# Patient Record
Sex: FEMALE | Race: WHITE | URBAN - NONMETROPOLITAN AREA
[De-identification: names, ages, dates, MRNs, and addresses within clinical notes are randomized per-mention and may not be internally consistent; named-entity substitution may affect disease eponyms.]

---

## 2022-09-22 ENCOUNTER — WEB ENCOUNTER (OUTPATIENT)
Dept: URBAN - NONMETROPOLITAN AREA CLINIC 4 | Facility: CLINIC | Age: 27
End: 2022-09-22

## 2022-09-27 ENCOUNTER — LAB OUTSIDE AN ENCOUNTER (OUTPATIENT)
Dept: URBAN - NONMETROPOLITAN AREA CLINIC 4 | Facility: CLINIC | Age: 27
End: 2022-09-27

## 2022-09-27 ENCOUNTER — OFFICE VISIT (OUTPATIENT)
Dept: URBAN - NONMETROPOLITAN AREA CLINIC 4 | Facility: CLINIC | Age: 27
End: 2022-09-27
Payer: COMMERCIAL

## 2022-09-27 ENCOUNTER — WEB ENCOUNTER (OUTPATIENT)
Dept: URBAN - NONMETROPOLITAN AREA CLINIC 4 | Facility: CLINIC | Age: 27
End: 2022-09-27

## 2022-09-27 VITALS
BODY MASS INDEX: 50.29 KG/M2 | HEART RATE: 78 BPM | SYSTOLIC BLOOD PRESSURE: 115 MMHG | DIASTOLIC BLOOD PRESSURE: 70 MMHG | TEMPERATURE: 97.5 F | HEIGHT: 63 IN | WEIGHT: 283.8 LBS

## 2022-09-27 DIAGNOSIS — E66.01 MORBID OBESITY: ICD-10-CM

## 2022-09-27 DIAGNOSIS — R13.10 DYSPHAGIA, UNSPECIFIED TYPE: ICD-10-CM

## 2022-09-27 DIAGNOSIS — K21.9 GASTROESOPHAGEAL REFLUX DISEASE, UNSPECIFIED WHETHER ESOPHAGITIS PRESENT: ICD-10-CM

## 2022-09-27 DIAGNOSIS — D50.9 IRON DEFICIENCY ANEMIA, UNSPECIFIED IRON DEFICIENCY ANEMIA TYPE: ICD-10-CM

## 2022-09-27 PROCEDURE — 99204 OFFICE O/P NEW MOD 45 MIN: CPT | Performed by: REGISTERED NURSE

## 2022-09-27 RX ORDER — POLYETHYLENE GLYCOL-3350 AND ELECTROLYTES 236; 6.74; 5.86; 2.97; 22.74 G/274.31G; G/274.31G; G/274.31G; G/274.31G; G/274.31G
AS DIRECTED POWDER, FOR SOLUTION ORAL
Qty: 1 KIT | Refills: 0 | OUTPATIENT

## 2022-09-27 RX ORDER — FLUOXETINE 40 MG/1
1 CAPSULE CAPSULE ORAL ONCE A DAY
Status: ACTIVE | COMMUNITY

## 2022-09-27 RX ORDER — FERROUS SULFATE 325(65) MG
1 TABLET TABLET ORAL ONCE A DAY
Status: ACTIVE | COMMUNITY

## 2022-09-27 RX ORDER — BUTALBITAL, ASPIRIN, AND CAFFEINE 50; 325; 40 MG/1; MG/1; MG/1
1 CAPSULE AS NEEDED CAPSULE ORAL
Status: ACTIVE | COMMUNITY

## 2022-09-27 RX ORDER — TIZANIDINE 4 MG/1
1 TABLET AS NEEDED TABLET ORAL THREE TIMES A DAY
Status: ACTIVE | COMMUNITY

## 2022-09-27 RX ORDER — CALCIUM CARBONATE/VITAMIN D3 600 MG-10
1 CAPSULE TABLET ORAL ONCE A DAY
Status: ACTIVE | COMMUNITY

## 2022-09-27 RX ORDER — LANSOPRAZOLE 30 MG/1
1 CAPSULE BEFORE A MEAL CAPSULE, DELAYED RELEASE ORAL ONCE A DAY
Status: ACTIVE | COMMUNITY

## 2022-09-27 RX ORDER — NAPROXEN 500 MG/1
1 TABLET WITH FOOD OR MILK AS NEEDED TABLET ORAL
Status: ACTIVE | COMMUNITY

## 2022-09-27 NOTE — HPI-TODAY'S VISIT:
9/27/22: Pt is a 26 yo female with PMH of morbid obesity, GERD, ANN who was referred by Dr. Wood for evaluation of GERD and ANN.  A copy of this document will be sent to the referring physician.  Pt reports Hx of GERD for several years. Seen by ENT in past. She has taken omeprazole and pantorazole in past. She currently takes lansoprazole which helps. She also reports dysphagia to solids and nausea. Denies odynophagia, vomiting, melena or hematochezia. Has had GB removed in 2013. She has occasional postprandial diarrhea. She also has oocasional constipation due to iron supplement. Has Hx of ANN for at least 10 years. Per labs 7/25, Hgb 13.2. Has never had GI wrokup in past. Has appt with bariatric surgeon, Dr. Michael Hagan and nutritionist on 10/13/22.

## 2022-10-11 ENCOUNTER — OFFICE VISIT (OUTPATIENT)
Dept: URBAN - METROPOLITAN AREA SURGERY CENTER 14 | Facility: SURGERY CENTER | Age: 27
End: 2022-10-11
Payer: COMMERCIAL

## 2022-10-11 DIAGNOSIS — R13.19 CERVICAL DYSPHAGIA: ICD-10-CM

## 2022-10-11 DIAGNOSIS — K21.9 ACID REFLUX: ICD-10-CM

## 2022-10-11 DIAGNOSIS — D50.9 ANEMIA: ICD-10-CM

## 2022-10-11 DIAGNOSIS — K31.89 ACQUIRED DEFORMITY OF DUODENUM: ICD-10-CM

## 2022-10-11 DIAGNOSIS — K22.4 DYSKINESIA OF ESOPHAGUS: ICD-10-CM

## 2022-10-11 PROCEDURE — 43450 DILATE ESOPHAGUS 1/MULT PASS: CPT | Performed by: INTERNAL MEDICINE

## 2022-10-11 PROCEDURE — G8907 PT DOC NO EVENTS ON DISCHARG: HCPCS | Performed by: INTERNAL MEDICINE

## 2022-10-11 PROCEDURE — 43239 EGD BIOPSY SINGLE/MULTIPLE: CPT | Performed by: INTERNAL MEDICINE

## 2022-10-11 PROCEDURE — 45378 DIAGNOSTIC COLONOSCOPY: CPT | Performed by: INTERNAL MEDICINE

## 2022-10-31 ENCOUNTER — WEB ENCOUNTER (OUTPATIENT)
Dept: URBAN - NONMETROPOLITAN AREA CLINIC 4 | Facility: CLINIC | Age: 27
End: 2022-10-31

## 2022-11-16 ENCOUNTER — OFFICE VISIT (OUTPATIENT)
Dept: URBAN - NONMETROPOLITAN AREA CLINIC 4 | Facility: CLINIC | Age: 27
End: 2022-11-16

## 2022-12-08 ENCOUNTER — WEB ENCOUNTER (OUTPATIENT)
Dept: URBAN - NONMETROPOLITAN AREA CLINIC 4 | Facility: CLINIC | Age: 27
End: 2022-12-08

## 2023-01-10 ENCOUNTER — DASHBOARD ENCOUNTERS (OUTPATIENT)
Age: 28
End: 2023-01-10

## 2023-01-10 ENCOUNTER — OFFICE VISIT (OUTPATIENT)
Dept: URBAN - NONMETROPOLITAN AREA CLINIC 4 | Facility: CLINIC | Age: 28
End: 2023-01-10
Payer: COMMERCIAL

## 2023-01-10 VITALS
SYSTOLIC BLOOD PRESSURE: 119 MMHG | TEMPERATURE: 97.5 F | HEIGHT: 63 IN | HEART RATE: 75 BPM | DIASTOLIC BLOOD PRESSURE: 72 MMHG | BODY MASS INDEX: 46.49 KG/M2 | WEIGHT: 262.4 LBS

## 2023-01-10 DIAGNOSIS — E66.01 MORBID OBESITY: ICD-10-CM

## 2023-01-10 DIAGNOSIS — R13.10 DYSPHAGIA, UNSPECIFIED TYPE: ICD-10-CM

## 2023-01-10 DIAGNOSIS — K21.9 GASTROESOPHAGEAL REFLUX DISEASE, UNSPECIFIED WHETHER ESOPHAGITIS PRESENT: ICD-10-CM

## 2023-01-10 DIAGNOSIS — D50.9 IRON DEFICIENCY ANEMIA, UNSPECIFIED IRON DEFICIENCY ANEMIA TYPE: ICD-10-CM

## 2023-01-10 DIAGNOSIS — K90.0 CELIAC DISEASE: ICD-10-CM

## 2023-01-10 DIAGNOSIS — K22.4 DYSKINESIA OF ESOPHAGUS: ICD-10-CM

## 2023-01-10 DIAGNOSIS — Q40.2 GASTRIC HETEROTOPIA: ICD-10-CM

## 2023-01-10 PROBLEM — 70450006: Status: ACTIVE | Noted: 2023-01-10

## 2023-01-10 PROBLEM — 87522002: Status: ACTIVE | Noted: 2022-09-27

## 2023-01-10 PROBLEM — 238136002: Status: ACTIVE | Noted: 2022-09-27

## 2023-01-10 PROBLEM — 266434009: Status: ACTIVE | Noted: 2022-10-26

## 2023-01-10 PROBLEM — 40739000: Status: ACTIVE | Noted: 2022-09-27

## 2023-01-10 PROBLEM — 235595009: Status: ACTIVE | Noted: 2022-09-27

## 2023-01-10 PROCEDURE — 99214 OFFICE O/P EST MOD 30 MIN: CPT | Performed by: REGISTERED NURSE

## 2023-01-10 RX ORDER — FERROUS SULFATE 325(65) MG
1 TABLET TABLET ORAL ONCE A DAY
Status: ACTIVE | COMMUNITY

## 2023-01-10 RX ORDER — LANSOPRAZOLE 30 MG/1
1 CAPSULE BEFORE A MEAL CAPSULE, DELAYED RELEASE ORAL ONCE A DAY
Status: ACTIVE | COMMUNITY

## 2023-01-10 RX ORDER — CALCIUM CARBONATE/VITAMIN D3 600 MG-10
1 CAPSULE TABLET ORAL ONCE A DAY
Status: ACTIVE | COMMUNITY

## 2023-01-10 RX ORDER — TIZANIDINE 4 MG/1
1 TABLET AS NEEDED TABLET ORAL THREE TIMES A DAY
Status: ACTIVE | COMMUNITY

## 2023-01-10 RX ORDER — BUTALBITAL, ASPIRIN, AND CAFFEINE 50; 325; 40 MG/1; MG/1; MG/1
1 CAPSULE AS NEEDED CAPSULE ORAL
Status: ACTIVE | COMMUNITY

## 2023-01-10 RX ORDER — NAPROXEN 500 MG/1
1 TABLET WITH FOOD OR MILK AS NEEDED TABLET ORAL
Status: ACTIVE | COMMUNITY

## 2023-01-10 RX ORDER — FLUOXETINE 40 MG/1
1 CAPSULE CAPSULE ORAL ONCE A DAY
Status: ACTIVE | COMMUNITY

## 2023-01-10 NOTE — HPI-TODAY'S VISIT:
9/27/22: Pt is a 26 yo female with PMH of morbid obesity, GERD, ANN who was referred by Dr. Wood for evaluation of GERD and ANN.  A copy of this document will be sent to the referring physician.  Pt reports Hx of GERD for several years. Seen by ENT in past. She has taken omeprazole and pantorazole in past. She currently takes lansoprazole which helps. She also reports dysphagia to solids and nausea. Denies odynophagia, vomiting, melena or hematochezia. Has had GB removed in 2013. She has occasional postprandial diarrhea. She also has oocasional constipation due to iron supplement. Has Hx of ANN for at least 10 years. Per labs 7/25, Hgb 13.2. Has never had GI wrokup in past. Has appt with bariatric surgeon, Dr. Michael Hagan and nutritionist on 10/13/22.  1/10/23: Pt RTC for f/u. Had EGD/colon 10/11/22. EGD findings: - Ectopic gastric mucosa in the upper third of the esophagus. Biopsied. - Hypertonic lower esophageal sphincter. Dilated. - Biopsies were taken with a cold forceps for histology for EoE of esophagus random upper and lower . - Biopsies were taken gastric with a cold forceps for Helicobacter pylori testing. - Biopsies were taken with duodenum a cold forceps for evaluation of celiac disease. Duodenal bx c/w celiac disease Cscope findings: - The entire examined colon is normal. - The examined portion of the ileum was normal. - No specimens collected. Denies any new complaints. No longer c/o dysphagia. Has not been following GF diet 100%. Had to hold off on bariatric surgery.

## 2023-01-17 ENCOUNTER — OFFICE VISIT (OUTPATIENT)
Dept: URBAN - METROPOLITAN AREA TELEHEALTH 2 | Facility: TELEHEALTH | Age: 28
End: 2023-01-17
Payer: COMMERCIAL

## 2023-01-17 DIAGNOSIS — K90.0 CELIAC DISEASE: ICD-10-CM

## 2023-01-17 PROCEDURE — 97802 MEDICAL NUTRITION INDIV IN: CPT | Performed by: DIETITIAN, REGISTERED

## 2023-01-17 RX ORDER — TIZANIDINE 4 MG/1
1 TABLET AS NEEDED TABLET ORAL THREE TIMES A DAY
Status: ACTIVE | COMMUNITY

## 2023-01-17 RX ORDER — NAPROXEN 500 MG/1
1 TABLET WITH FOOD OR MILK AS NEEDED TABLET ORAL
Status: ACTIVE | COMMUNITY

## 2023-01-17 RX ORDER — BUTALBITAL, ASPIRIN, AND CAFFEINE 50; 325; 40 MG/1; MG/1; MG/1
1 CAPSULE AS NEEDED CAPSULE ORAL
Status: ACTIVE | COMMUNITY

## 2023-01-17 RX ORDER — LANSOPRAZOLE 30 MG/1
1 CAPSULE BEFORE A MEAL CAPSULE, DELAYED RELEASE ORAL ONCE A DAY
Status: ACTIVE | COMMUNITY

## 2023-01-17 RX ORDER — CALCIUM CARBONATE/VITAMIN D3 600 MG-10
1 CAPSULE TABLET ORAL ONCE A DAY
Status: ACTIVE | COMMUNITY

## 2023-01-17 RX ORDER — FLUOXETINE 40 MG/1
1 CAPSULE CAPSULE ORAL ONCE A DAY
Status: ACTIVE | COMMUNITY

## 2023-01-17 RX ORDER — FERROUS SULFATE 325(65) MG
1 TABLET TABLET ORAL ONCE A DAY
Status: ACTIVE | COMMUNITY

## 2023-01-18 PROBLEM — 396331005: Status: ACTIVE | Noted: 2023-01-10

## 2023-07-10 ENCOUNTER — OFFICE VISIT (OUTPATIENT)
Dept: URBAN - NONMETROPOLITAN AREA CLINIC 4 | Facility: CLINIC | Age: 28
End: 2023-07-10